# Patient Record
Sex: MALE | Race: ASIAN | NOT HISPANIC OR LATINO | URBAN - METROPOLITAN AREA
[De-identification: names, ages, dates, MRNs, and addresses within clinical notes are randomized per-mention and may not be internally consistent; named-entity substitution may affect disease eponyms.]

---
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
## 2024-04-08 NOTE — ED PEDIATRIC NURSE NOTE - ED STAT RN HANDOFF TIME
Answers submitted by the patient for this visit:  Patient Health Questionnaire (Submitted on 3/25/2024)  If you checked off any problems, how difficult have these problems made it for you to do your work, take care of things at home, or get along with other people?: Very difficult  PHQ9 TOTAL SCORE: 16      Aure Joy comes into clinic today at the request of Dr. Zamudio Ordering Provider for Med Injection only ketamine .    Procedure Prep:  Medication double check completed by: Teodora Donnelly RN  Prior to administration pt identified by name and : Yes    Nursing Assessment:  Appearance: Casual   Mood: Fine  Affect: WNL  Eye contact: Good      2024     9:14 AM   PHQ   PHQ-9 Total Score 17   Q9: Thoughts of better off dead/self-harm past 2 weeks Not at all     QIDDSR 16 weekly assessment: score 16 Assessment was scanned to EHR.     Procedure Performed:  VSS and mental status WNL. Patient was given ketamine. See MAR for details.     Post Procedure Assessment:  Patient tolerated the treatment with the following side effects: dissociation. Vital signs were monitored, see VS Flowsheet. Patient stated they felt ready to go home prior to discharge. AVS was offered to patient and patient declined. Patient was instructed not to drive for the remainder of the day and to notify clinic if any concerns arise.     Next appt: Wednesday    Medications were supplied by Clinic       This service provided today was under the supervising provider of the day Dr. Elizabeth Gordon, who was available if needed.        Perla Robbins RN        
17:30